# Patient Record
Sex: FEMALE | Race: OTHER | Employment: FULL TIME | URBAN - METROPOLITAN AREA
[De-identification: names, ages, dates, MRNs, and addresses within clinical notes are randomized per-mention and may not be internally consistent; named-entity substitution may affect disease eponyms.]

---

## 2022-11-14 ENCOUNTER — OFFICE VISIT (OUTPATIENT)
Dept: SURGERY | Age: 22
End: 2022-11-14

## 2022-11-14 VITALS
DIASTOLIC BLOOD PRESSURE: 69 MMHG | SYSTOLIC BLOOD PRESSURE: 139 MMHG | HEIGHT: 64 IN | HEART RATE: 100 BPM | OXYGEN SATURATION: 100 % | WEIGHT: 259.7 LBS | TEMPERATURE: 96.9 F | BODY MASS INDEX: 44.34 KG/M2

## 2022-11-14 DIAGNOSIS — G47.30 SLEEP-DISORDERED BREATHING: ICD-10-CM

## 2022-11-14 DIAGNOSIS — F17.200 SMOKER: ICD-10-CM

## 2022-11-14 DIAGNOSIS — E66.01 MORBID OBESITY (HCC): Primary | ICD-10-CM

## 2022-11-14 DIAGNOSIS — R12 HEARTBURN: ICD-10-CM

## 2022-11-14 DIAGNOSIS — D64.9 ANEMIA, UNSPECIFIED TYPE: ICD-10-CM

## 2022-11-14 DIAGNOSIS — E66.01 MORBID OBESITY WITH BMI OF 40.0-44.9, ADULT (HCC): ICD-10-CM

## 2022-11-14 PROBLEM — F32.A DEPRESSION: Status: ACTIVE | Noted: 2022-11-14

## 2022-11-14 PROBLEM — F41.9 ANXIETY: Status: ACTIVE | Noted: 2022-11-14

## 2022-11-14 PROBLEM — N92.0 HEAVY MENSES: Status: ACTIVE | Noted: 2022-11-14

## 2022-11-14 PROBLEM — N39.3 SUI (STRESS URINARY INCONTINENCE, FEMALE): Status: ACTIVE | Noted: 2022-11-14

## 2022-11-14 PROBLEM — N92.6 MENSTRUAL IRREGULARITY: Status: ACTIVE | Noted: 2022-11-14

## 2022-11-14 PROBLEM — E55.9 VITAMIN D DEFICIENCY: Status: ACTIVE | Noted: 2022-11-14

## 2022-11-14 PROBLEM — M54.50 LOW BACK PAIN: Status: ACTIVE | Noted: 2022-11-14

## 2022-11-14 PROCEDURE — 99205 OFFICE O/P NEW HI 60 MIN: CPT | Performed by: NURSE PRACTITIONER

## 2022-11-14 RX ORDER — IBUPROFEN 800 MG/1
TABLET ORAL
COMMUNITY
Start: 2022-09-06

## 2022-11-14 RX ORDER — ETONOGESTREL AND ETHINYL ESTRADIOL 11.7; 2.7 MG/1; MG/1
INSERT, EXTENDED RELEASE VAGINAL
COMMUNITY
Start: 2022-11-03

## 2022-11-14 RX ORDER — LANOLIN ALCOHOL/MO/W.PET/CERES
325 CREAM (GRAM) TOPICAL DAILY
COMMUNITY
Start: 2022-11-03

## 2022-11-14 RX ORDER — SERTRALINE HYDROCHLORIDE 50 MG/1
50 TABLET, FILM COATED ORAL DAILY
COMMUNITY
Start: 2022-11-03

## 2022-11-14 RX ORDER — VALACYCLOVIR HYDROCHLORIDE 500 MG/1
500 TABLET, FILM COATED ORAL 2 TIMES DAILY
COMMUNITY
Start: 2022-11-03

## 2022-11-14 NOTE — PROGRESS NOTES
Bariatric Surgery Consultation    Subjective: The patient is a 20 y.o. obese female with a Body mass index is 44.58 kg/m². .  The patient is at her heaviest weight for the past several years. she has been overweight since 2020 and moving to [place]. she has been considering surgery since past year. she desires surgery at this time because of multiple health concerns and their lifestyle issues which are hindered by their weight. she has been referred by her family physician Dr Brett Felix for evaluation and treatment of their obesity via surgical intervention. Loy Roldan has tried multiple diets in her lifetime most recently tried behavior modification and unsupervised diets    Bariatric comorbidities present are   Patient Active Problem List   Diagnosis Code    Morbid obesity ([facility] [address] E66.01    Morbid obesity with BMI of 40.0-44.9, adult ([facility] [address] E66.01, Z68.41    Anxiety F41.9    Depression F32. A    Menstrual irregularity N92.6    Sleep-disordered breathing G47.30    Anemia D64.9    Heavy menses N92.0    Smoker F17.200    CHIDI (stress urinary incontinence, female) N39.3    Vitamin D deficiency E55.9    Heartburn R12    Low back pain M54.50       The patient is considering laparoscopic sleeve gastrectomy for surgical weight loss due to their ineffective progress with medical forms of weight loss and the urging of their physician who cares for their primary medical issues. The patient  now presents  for consideration for weight loss surgery understanding the benefits of this over a medical approach of weight loss as was discussed in our presentation on weight loss surgery. They have discussed their plans both with their family and primary care physician who is in support of their pursuit of such. The patient has not had health issues as of late and denies and gastrointestinal disturbances other than what is outlined below in their review of symptoms.  All of their prior evaluations available by both their PCP's and specialists physicians have been reviewed today either in the Care Everywhere portal or scanned under the media tab. I have spent a large portion of my initial consultation today reviewing the patients current dietary habits which have contributed to their health issues and obesity. I have suggested to them personally a dietary regimen that they can initiate now to help with their status as it pertains to their weight. They understand that the most important aspect of their journey through their weight loss endeavor will be their adherence to a new lifestyle of healthy eating behavior. They also understand that an adherence to an exercise program will not only help with weight loss but is ultimately important in weight maintenance. The patients goal weight is 163 lb. These goals are consistent with expected outcomes of their desired operation. her Medical goals are resolution of these health issues.         Patient Active Problem List    Diagnosis Date Noted    Morbid obesity ([facility] [address] 11/14/2022    Morbid obesity with BMI of 40.0-44.9, adult ([facility] [address] 11/14/2022    Anxiety 11/14/2022    Depression 11/14/2022    Menstrual irregularity 11/14/2022    Sleep-disordered breathing 11/14/2022    Anemia 11/14/2022    Heavy menses 11/14/2022    Smoker 11/14/2022    CHIDI (stress urinary incontinence, female) 11/14/2022    Vitamin D deficiency 11/14/2022    Heartburn 11/14/2022    Low back pain 11/14/2022     Past Surgical History:   Procedure Laterality Date    HX DILATION AND CURETTAGE  2017    HX DILATION AND CURETTAGE  2022    HX POLYPECTOMY      endometrial    HX WISDOM TEETH EXTRACTION        Social History     Tobacco Use    Smoking status: Every Day     Packs/day: 0.25     Types: Cigarettes    Smokeless tobacco: Never   Substance Use Topics    Alcohol use: Yes      Family History   Problem Relation Age of Onset    Diabetes Mother     Diabetes Father     High Cholesterol Father       Current Outpatient Medications Medication Sig Dispense Refill    ethinyl estradiol-etonogestrel (NUVARING) 0.12-0.015 mg/24 hr vaginal ring INSERT 1 RING VAGINALLY AS DIRECTED. REMOVE AFTER 3 WEEKS & WAIT 7 DAYS BEFORE INSERTING A NEW RING      ferrous sulfate 325 mg (65 mg iron) tablet Take 325 mg by mouth daily. ibuprofen (MOTRIN) 800 mg tablet TAKE 1 TABLET BY MOUTH THREE TIMES A DAY WITH FOOD      valACYclovir (VALTREX) 500 mg tablet Take 500 mg by mouth two (2) times a day. sertraline (ZOLOFT) 50 mg tablet Take 50 mg by mouth daily.        No Known Allergies       Review of Systems:       General - No history or complaints of unexpected fever, chills, or weight loss  Head/Neck - No history or complaints of headache, diplopia, dysphagia, hearing loss  Cardiac - No history or complaints of chest pain, palpitations, murmur, or shortness of breath  Pulmonary - No history or complaints of shortness of breath, productive cough, hemoptysis  Gastrointestinal - has reflux daily requiring OTC meds,no  abdominal pain, obstipation/constipation or blood per rectum  Genitourinary - No history or complaints of hematuria/dysuria, stress urinary incontinence symptoms, or renal lithiasis  Musculoskeletal - has joint pain in their low back,  no muscular weakness  Hematologic - No history or complaints of bleeding disorders,  No blood transfusions  Neurologic - No history or complaints of  migraine headaches, seizure activity, syncopal episodes, TIA or stroke  Integumentary - No history or complaints of rashes, abnormal nevi, skin cancer  Gynecological - Has heavy, irregular menses               Objective:   Visit Vitals  /69 (BP 1 Location: Left upper arm, BP Patient Position: Sitting, BP Cuff Size: Large adult)   Pulse 100   Temp 96.9 °F (36.1 °C)   Ht 5' 4\" (1.626 m)   Wt 117.8 kg (259 lb 11.2 oz)   SpO2 100%   BMI 44.58 kg/m²       Physical Examination: General appearance - alert, well appearing, and in no distress  Mental status - alert, oriented to person, place, and time  Eyes - pupils equal and reactive, extraocular eye movements intact  Neck - supple, no significant adenopathy  Lymphatics - no palpable lymphadenopathy, no hepatosplenomegaly  Chest - clear to auscultation, no wheezes, rales or rhonchi, symmetric air entry  Heart - normal rate, regular rhythm, normal S1, S2, no murmurs, rubs, clicks or gallops  Abdomen - soft, nontender, nondistended, no masses or organomegaly  Back exam - full range of motion, no tenderness, palpable spasm or pain on motion  Neurological - alert, oriented, normal speech, no focal findings or movement disorder noted  Musculoskeletal - no joint tenderness, deformity or swelling  Extremities - peripheral pulses normal, no pedal edema, no clubbing or cyanosis  Skin - normal coloration and turgor, no rashes, no suspicious skin lesions noted    Labs:       No results found for this or any previous visit (from the past 1440 hour(s)). Reviewed labs in [facility] from 9/17/22:  Crt 0.7  LFTs WNL  Hgb 10.6  Hct 35.3    8/1/22: TSH 1.07    CT ABD/PELV IV ONLY 9/17/22  ABDOMEN CT WITH CONTRAST:     No significant abnormality is identified at the lung bases. The heart size is normal.     There is evidence of diffuse hepatic steatosis. No focal hepatic lesions are identified. No focal abnormalities are seen in the gallbladder, spleen, kidneys, adrenal glands or pancreas. The kidneys enhance normally without evidence of hydronephrosis. There is no mass, free fluid, free air, or inflammatory process in the upper abdomen. Visualized bowel loops appear normal. A normal appendix is identified. PELVIS CT WITH CONTRAST:     Scans were continued through the pelvis. The bowel does not appear obstructed. There is thickening of the wall of the colon from the splenic fracture to the proximal sigmoid colon. Mild marginal inflammatory changes are identified.      The urinary bladder, uterus, and adnexal structures appear normal. A cervical diaphragm is identified. There is no evidence of pelvic mass or adenopathy. There is no free fluid or loculated fluid collection. No significant osseous abnormality is identified. IMPRESSION     Technically limited exam secondary to omission of oral contrast.     Findings are suspicious for an acute infectious or inflammatory descending colitis. There is no evidence of bowel obstruction. No perforation or abscess formation is identified. The bowel does not appear obstructed. Assessment:     Morbid obesity with comorbidity    Plan:     laparoscopic gastric bypass surgery and laparoscopic sleeve gastrectomy    This is a 20 y.o. female with a BMI of Body mass index is 44.58 kg/m². and the weight-related co-morbidties as noted below. Carlos Pablo meets the NIH criteria for bariatric surgery based upon the BMI of Body mass index is 44.58 kg/m². and multiple weight-related co-morbidties. Carlos Pbalo has elected laparoscopic sleeve gastrectomy as her intervention of choice for treatment of morbid obestiy through surgical means secondary to its safety profile, rapid return to work  and decreases in operative risks over gastric bypass. In the office today, following George's history and physical examination, a 30 minute discussion regarding the anatomic alterations for the laparoscopic sleeve gastrectomy was undertaken. The dietary expectations and the patient and physician dependent factors for success were thoroughly discussed, to include the need for interval follow-up and long-term dietary changes associated with success. The possible complications of the sleeve gastrectomy  were also discussed, to include;death, DVT/PE, staple line leak, bleeding, stricture formation, infection, nutritional deficiencies and sleeve dilation.   Specific weight related outcomes for success were also discussed with an emphasis on careful and close follow-up with the first year and eating behavior modification as the baseline and cyclical hunger return. The patient expressed an understanding of the above factors, and her questions were answered in their entirety. In addition, the patient attended a 1.5 hour power point seminar regarding obesity, surgical weight loss including, adjustable gastric band, gastric bypass, and sleeve gastrectomy. This discussion contrasted the different surgical techniques, mechanisms of actions and expected outcomes, and surgical and medical risks associated with each procedure. Today, the patient had all of her questions answered and desires to proceed with  bariatric surgery initially choosing sleeve gastrectomy as her surgical option. Is wanting to have sleeve gastrectomy by aware of possible need for gastric bypass. Will schedule UGI to evaluate anatomy. Secondary Diagnoses:     Dietary Intervention  - The patient is currently scheduled to see or has been followed by a bariatric nutritionist for an attempt at preoperative weight loss as has been dictated by their insurance carrier. They will be assessed at various times during their follow up to evaluate their progress depending on the length of time that is required once again by their carrier. I have explained the importance of preoperative weight loss and the benefits regarding lower surgical risk and also assisting the patient in reaching their weight loss goal.  Finally they understand there is a physiologic benefit from the standpoint of hepatic volume reduction and reduction of central visceral adiposity preoperatively. I have reiterated the importance of a low carbohydrate and high protein regimen to achieve their stated goal. I have reviewed their current eating behavior prior to this encounter and explained to them in an exhaustive fashion the appropriate diet that they should adhere to.  They have been encouraged to loose weight pre operatively and understand it is our prerogative to cancel surgery or postpone their procedure in the event of significant weight gain. GERD -The patient understands that weight loss surgery is not a guaranteed cure for reflux disease but does understand the benefits that weight loss can have on reflux disease. They also understand that at the time of surgery the gastroesophageal junction will be evaluated for the presence of a diaphragmatic hernia. Hernias will be corrected always with the gastric band and sleeve gastrectomy procedures, but only on a case by case basis with the gastric bypass. The patient also understands that neither weight loss surgery nor repair of a diaphragmatic hernia repair guarantees the complete cessation of the disease. Smoking Cessation - Today I have counseled the patient extensively regarding smoking cessation. They have been counseled extensively about the detrimental effects of smoking on their weight loss surgical procedure particularly for the gastric bypass and sleeve gastrectomy procedures. They understand that smoking leads to pulmonary issues postoperatively and can lead to gastric ulcers and marginal ulcers in the post bariatric surgery pouch that has been created. They understand that they must stop smoking prior to surgery or it may affect their ultimate progression to their procedure. Is aware she must be free of all forms of nicotine and inhaled substances 6 weeks prior to surgery. Probable Obstructive Sleep Apnea -The patient understands the association of sleep apnea and obesity and the additional risk that it caries related to post surgical complications. If they have not been tested for sleep apnea and I feel they are at increased risk for this diagnosis, then they will be scheduled for a consultation with a Pulmonologist for such.  In the event that they arlette this diagnosis we will have the patient bring their CPAP machine to the hospital for use both postoperatively in the PACU and on the floor at its appropriate setting. We will have them continue using it while at home after surgery and follow up with their pulmonologist 6 months after to be retested to see if it can be discontinued at that time period. Has sleep study ordered through PCP. I spent a total of 60 minutes providing this service to the patient today to include discussing their surgical choice, reviewing their work-up to date, and performing a full history and physical examination.   Signed By: Carlos Reyes NP     November 14, 2022

## 2022-11-30 ENCOUNTER — HOSPITAL ENCOUNTER (OUTPATIENT)
Dept: BARIATRICS/WEIGHT MGMT | Age: 22
Discharge: HOME | End: 2022-11-30

## 2022-11-30 ENCOUNTER — APPOINTMENT (OUTPATIENT)
Dept: GENERAL RADIOLOGY | Age: 22
End: 2022-11-30
Attending: SPECIALIST
Payer: MEDICAID

## 2022-11-30 ENCOUNTER — HOSPITAL ENCOUNTER (OUTPATIENT)
Age: 22
Setting detail: OUTPATIENT SURGERY
Discharge: HOME | End: 2022-11-30
Attending: SPECIALIST | Admitting: SPECIALIST
Payer: MEDICAID

## 2022-11-30 ENCOUNTER — APPOINTMENT (OUTPATIENT)
Dept: SURGERY | Age: 22
End: 2022-11-30

## 2022-11-30 VITALS — HEIGHT: 64 IN | BODY MASS INDEX: 43.83 KG/M2 | WEIGHT: 256.7 LBS

## 2022-11-30 VITALS
OXYGEN SATURATION: 100 % | DIASTOLIC BLOOD PRESSURE: 76 MMHG | BODY MASS INDEX: 43.72 KG/M2 | TEMPERATURE: 98.4 F | HEART RATE: 74 BPM | HEIGHT: 64 IN | SYSTOLIC BLOOD PRESSURE: 127 MMHG | WEIGHT: 256.1 LBS | RESPIRATION RATE: 18 BRPM

## 2022-11-30 DIAGNOSIS — R12 HEARTBURN: Primary | ICD-10-CM

## 2022-11-30 DIAGNOSIS — E66.01 MORBID OBESITY (HCC): ICD-10-CM

## 2022-11-30 PROCEDURE — 74011000250 HC RX REV CODE- 250: Performed by: SPECIALIST

## 2022-11-30 PROCEDURE — 74240 X-RAY XM UPR GI TRC 1CNTRST: CPT

## 2022-11-30 PROCEDURE — 76040000019: Performed by: SPECIALIST

## 2022-11-30 NOTE — PROCEDURES
Rosalina Faulkner MD Patient:George Zhou   : 2000  Medical Record [id number]    PREPROCEDURE DIAGNOSIS: This patient is preoperative for laparoscopic sleeve gastrectomy procedure with a history of  reflux disease. POSTPROCEDURE DIAGNOSIS: This patient is preoperative for laparoscopic sleeve gastrectomy procedure with a history of  reflux disease. PROCEDURES PERFORMED: Upper GI study with barium. ESTIMATED BLOOD LOSS: None. SPECIMENS: None. STATEMENT OF MEDICAL NECESSITY: The patient is a patient with a  longstanding history of obesity. They are now considering the laparoscopic sleeve gastrectomy procedure as a means of surgical weight control and due to their history of reflux disease and are being assessed preoperatively for such. DESCRIPTION OF PROCEDURE: The patient was brought to the fluoroscopy unit and  was given thin barium. On swallowing of barium, they were noted to have  normal peristalsis of their esophagus. They had prompt filling of distal  esophagus with tapering into the gastroesophageal junction. There was no evidence of a hiatal hernia present. Contrast then filled the gastric cardia, fundus,body and pre pyloric region with no abnormalities noted. Contrast then exited the pylorus in normal fashion. No obstruction was noted. There was no evidence of reflux noted.     (normal anatomy)    Rosalina Faulkner MD

## 2022-11-30 NOTE — PROGRESS NOTES
Medical Weight Loss Multi-Disciplinary Program    Patient's Name: Bailee Goff Age: 20 y.o. YOB: 2000 Sex: female     Session #1. Pt attended in-person class. Weight obtained in office. Date: 11/30/2022     Visit Vitals  Ht 5' 4\" (1.626 m)   Wt 116.4 kg (256 lb 11.2 oz)   BMI 44.06 kg/m²       Pounds Lost since last month: N/A Pounds Gained since last month: N/A       Starting Weight (Initial Consult/Seminar, ***): *** lbs Previous Months Weight: N/A   Overall Pounds Lost: *** lbs  Overall Pounds Gained: *** lbs     ***     Do you smoke? ***    Alcohol intake:  Number of drinks per week:  ***    Class Guidelines    Guidelines are reviewed with patient at the start of every class. 1. Patient understands that weight loss trial classes must be consecutive. Patient understands if they miss a class, it is their responsibility to contact me to reschedule class. I will reach out to patient after their first no show. 2.  Patient understands the expectations that weight maintenance/weight loss is expected during the classes. Failure to demonstrate changes may result in extension of weight loss trial, followed by returning to see the surgeon. Patient understands that they CANNOT gain any weight during the weight loss trial.  Gaining weight will result in extension of weight loss trial.  3. Patient is also instructed to complete their labwork, psychological evaluation visit, and any other tests that the surgeon has ordered while they are working on their weight loss trial.  4.  Patient was instructed to bring their packet of nutrition education materials to every class and appointment. Eating Habits and Behaviors    Reviewed intake  Understanding low carbohydrates, low sugar, higher protein meals  Instruction given for personal dietary changes  Discussed perceived compliance    Comments: RD Reviewed Diet History and Physical Activity/Exercise habits.   Recommended dietary changes discussed for both before and after surgery. Today in class we reviewed the Key Diet Principles. Patient was encouraged to consume 3 meals each day, and meal timing was reviewed. Meal time behaviors that will help pt to be successful with their weight loss efforts were also discussed. We discussed the importance of drinking adequate amounts of fluids, recommending that patient consume a minimum of 64 oz of sugar-free, caffeine-free and carbonation-free fluids each day. Patient was encouraged to eliminate sugar-sweetened beverages such as sweet tea, fruit juice, fruit smoothies, flavored coffee drinks and regular sodas. During the weight loss trial, patient is encouraged to focus on eating protein-forward meals, with a daily goal of  grams protein. Patient was also advised to decrease carbohydrate intake to <100 g/d, choosing complex vs. simple carbohydrates in limited amounts. We also discussed limiting fat intake. Encouraged patient to follow the \"3-gram rule\" when choosing foods by looking for items containing <3 g of fat and sugar per serving. We reviewed meal planning guidelines and discussed appropriate meal and snack options. We also talked about appropriate protein drinks and patient was encouraged to start trying these, using them either for a meal replacement or a protein-rich snack pre-operatively. We reviewed the nutritional guidelines for selecting protein shakes. Pre-operative vitamin and mineral supplementation was reviewed. Patient was instructed to choose a chewable complete multivitamin with iron in NON-gummy form. Selection of probiotic was also reviewed. Comments: During today's class we talked about the role of carbohydrates. Patient was instructed on: The function of carbohydrates. Foods that are carbohydrate-heavy. Patient was given the guidelines to keep their carbohydrates less than 50-75 grams per day in the pre-op phase.   Patient was also given ideas of low carb swaps, which include zucchini noodles, spaghetti squash, or cauliflower rice. Discussed lower carb swaps to use instead of potato chips. Patient's current diet habits include:  Patient is eating *** meals and *** snacks per day. Per dietary recall, pts diet has the following concerns:  ***   Pt has found *** protein supplement shakes for use post-op in meeting their protein needs. Patient's plan for dietary and/or behavior changes in the upcoming month: ***    Physical Activity/Exercise    Comments: We talked about exercise. Patient was given reasons of why exercise is so important and how that can help with their long-term success. I have encouraged patient to get a support system to help with the activity. We talked about recommended types of physical activity, including walking, swimming, cycling, or chair exercises. I also talked with patient about doing some strength training, which helps the metabolism, as well as strengthen muscles and bones. Patient's plan to incorporate more activity includes: ***      Behavior Modification     Comments:  During today's class, I discussed vitamin and mineral supplementation essential for health and prevention of malnutrition in depth. Patient was directed to the handouts on vitamins and minerals found on pages 27-33 that were provided in class handouts packet as well as a handout titled, \"Nutrient Deficiency and it's symptoms\". I reviewed the vitamins and minerals that need to be supplemented, dosage recommendations, functions of supplements and signs of deficiencies, as well as examples of specific supplements. Reviewed briefly the requirement  differences between laparoscopic gastric bypass, laparoscopic sleeve gastrectomy, and lap-band procedures, while encouraging all patients to continue supplements for life no matter what bariatric surgery they are preparing for. Goals:    Work to increase to 3-4 small meals per day, with 1-2 planned snacks as needed. Recommend following the plate method for meal planning - focusing on lean protein, non-starchy vegetables, and measured amounts of complex carbohydrates. - Goal of  g protein and <100 g carbohydrates per day. - Select at least 2 DIFFERENT protein supplements that can be used for protein supplementation to meet goals pre- and post-operatively. - Practice Carbohydrate Counting and label reading.   - Follow 3 g rule for fat and sugar. 2. Slow down meals  - Chew each bite 25-35 times. - Aim for 20-30 min/meal.  3. Increase non-caloric fluids to 64 oz per day. Eliminate caffeine, added sugar, carbonation, and straws.               - Continue to work to decrease sugar sweetened beverages - goal of calorie-free beverages only. - Must eliminate caffeine prior to surgery and avoid for ~6-8 weeks. - Practice 30:30 rule,  food and fluid intake. 4. Start activity regimen, work to increase ADLs. 5. Start Complete MVI and probiotic at least 30 days pre-op. Candidate for surgery (per RD): PENDING, Pt scheduled for nutrition education on 12/20/22.